# Patient Record
Sex: FEMALE | Race: OTHER | Employment: OTHER | URBAN - METROPOLITAN AREA
[De-identification: names, ages, dates, MRNs, and addresses within clinical notes are randomized per-mention and may not be internally consistent; named-entity substitution may affect disease eponyms.]

---

## 2022-08-22 ENCOUNTER — ESTABLISHED PATIENT (OUTPATIENT)
Dept: URBAN - METROPOLITAN AREA CLINIC 19 | Facility: CLINIC | Age: 67
End: 2022-08-22

## 2022-08-22 DIAGNOSIS — H43.393: ICD-10-CM

## 2022-08-22 PROCEDURE — 92014 COMPRE OPH EXAM EST PT 1/>: CPT

## 2022-08-22 ASSESSMENT — KERATOMETRY
OD_AXISANGLE_DEGREES: 032
OD_K2POWER_DIOPTERS: 44.50
OS_AXISANGLE_DEGREES: 160
OD_AXISANGLE2_DEGREES: 122
OS_AXISANGLE_DEGREES: 172
OD_K2POWER_DIOPTERS: 44.75
OD_AXISANGLE2_DEGREES: 106
OD_K1POWER_DIOPTERS: 44.25
OS_AXISANGLE2_DEGREES: 70
OS_K1POWER_DIOPTERS: 44.25
OD_AXISANGLE_DEGREES: 016
OS_K2POWER_DIOPTERS: 45.25
OS_K1POWER_DIOPTERS: 44.00
OS_AXISANGLE2_DEGREES: 82

## 2022-08-22 ASSESSMENT — VISUAL ACUITY
OD_SC: 20/20
OU_CC: 20/20
OS_SC: 20/25

## 2022-08-22 ASSESSMENT — TONOMETRY
OD_IOP_MMHG: 18
OS_IOP_MMHG: 19

## 2023-08-22 ENCOUNTER — ESTABLISHED PATIENT (OUTPATIENT)
Dept: URBAN - METROPOLITAN AREA CLINIC 19 | Facility: CLINIC | Age: 68
End: 2023-08-22

## 2023-08-22 DIAGNOSIS — H11.443: ICD-10-CM

## 2023-08-22 DIAGNOSIS — H43.393: ICD-10-CM

## 2023-08-22 PROCEDURE — 92014 COMPRE OPH EXAM EST PT 1/>: CPT

## 2023-08-22 ASSESSMENT — TONOMETRY
OS_IOP_MMHG: 20
OD_IOP_MMHG: 21

## 2023-08-22 ASSESSMENT — KERATOMETRY
OD_K2POWER_DIOPTERS: 45
OS_AXISANGLE2_DEGREES: 75
OS_AXISANGLE_DEGREES: 165
OS_K2POWER_DIOPTERS: 45
OD_AXISANGLE_DEGREES: 45
OS_K1POWER_DIOPTERS: 44.5
OD_K1POWER_DIOPTERS: 44.25
OD_AXISANGLE2_DEGREES: 135

## 2023-08-22 ASSESSMENT — VISUAL ACUITY
OU_CC: 20/20
OD_SC: 20/30-2
OS_SC: 20/30-2
OU_SC: 20/25-2

## 2025-02-03 ENCOUNTER — COMPREHENSIVE EXAM (OUTPATIENT)
Age: 70
End: 2025-02-03

## 2025-02-03 DIAGNOSIS — H11.443: ICD-10-CM

## 2025-02-03 DIAGNOSIS — H43.393: ICD-10-CM

## 2025-02-03 PROCEDURE — 92014 COMPRE OPH EXAM EST PT 1/>: CPT

## 2025-03-18 NOTE — PATIENT DISCUSSION
Epiretinal Membrane Counseling: The diagnosis and natural history of epiretinal membrane was discussed with the patient including the risk of progression with retinal traction resulting in visual distortion. The patient is instructed to call back immediately if any vision changes, and keep follow up as scheduled. Stable.  Oncology manages.